# Patient Record
Sex: FEMALE
[De-identification: names, ages, dates, MRNs, and addresses within clinical notes are randomized per-mention and may not be internally consistent; named-entity substitution may affect disease eponyms.]

---

## 2021-04-16 ENCOUNTER — HOSPITAL ENCOUNTER (EMERGENCY)
Dept: HOSPITAL 52 - LL.ED | Age: 41
Discharge: HOME | End: 2021-04-16
Payer: COMMERCIAL

## 2021-04-16 DIAGNOSIS — E66.9: ICD-10-CM

## 2021-04-16 DIAGNOSIS — Z71.6: ICD-10-CM
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
## 2021-04-16 DIAGNOSIS — S60.221A: Primary | ICD-10-CM
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
## 2021-04-16 DIAGNOSIS — Z72.0: ICD-10-CM

## 2021-04-16 NOTE — EDM.PDOC
ED HPI GENERAL MEDICAL PROBLEM





- General


Chief Complaint: Lower Extremity Injury/Pain


Stated Complaint: FALL AT WORK, RIGHT LEG PAIN


Time Seen by Provider: 21 02:05


Source of Information: Reports: Patient.  Denies: Old Records (No [facility] records available)


History Limitations: Reports: No Limitations





- History of Present Illness


INITIAL COMMENTS - FREE TEXT/NARRATIVE: 





Patient was brought to the emergency room via transport vehicle from [facility] for 

evaluation of a Workmen's Compensation injury, which occurred at about 1:05 AM 

this morning.  The patient accidentally tripped over the forks of a forklift 

landing forward onto her hands and landing on her right side.  She complains of 

7/10 mid right thigh pain with some difficulty bearing weight and walking.  She 

denies any hip pain, neck/back pain, paresthesias, head injury, neurological 

deficits, or other complaints or injuries.  She has not injured this area in the

past.  The patient also has some mild abrasions on her palms bilaterally with no

history of foreign body, etc.  No recent history of abdominal pain, heartburn, 

nausea, diarrhea, melena, gross hematochezia, or any food intolerance, including

fatty foods, etc., although she has just recovered from the stomach flu.  The 

patient also denies any recent fever, cough, wheezing, dyspnea, etc..


Onset: Today, Sudden


Onset Date: 21


Onset Time: 01:05


Duration: Constant


Location: Reports: Upper Extremity, Left, Upper Extremity, Right, Lower 

Extremity, Right.  Denies: Head, Face, Neck, Chest, Abdomen, Back, Pelvis, Lower

Extremity, Left, Radiates to


Quality: Reports: Ache


Severity: Moderate


Improves with: Reports: Rest


Worsens with: Reports: Movement


Context: Reports: Other (As above).  Denies: Sick Contact, Trauma


Associated Symptoms: Denies: Confusion, Chest Pain, Cough, Diaphoresis, 

Fever/Chills, Headaches, Loss of Appetite, Malaise, Nausea/Vomiting, Seizure, 

Shortness of Breath, Syncope, Weakness


Treatments PTA: Reports: Cold Therapy


  ** Right Upper Leg


Pain Score (Numeric/FACES): 7





- Related Data


                                    Allergies











Allergy/AdvReac Type Severity Reaction Status Date / Time


 


No Known Drug Allergies Allergy  Other Verified 21 01:49











Home Meds: 


                                    Home Meds





. [No Known Home Meds]  21 [History]











Past Medical History


HEENT History: Reports: Impaired Vision, Other (See Below)


Other HEENT History: The patient was glasses


Cardiovascular History: Reports: None.  Denies: Heart Murmur, Hypertension


Respiratory History: Reports: None.  Denies: Asthma, COPD


OB/GYN History: Reports: Pregnancy, Spontaneous 


: 7


Para: 5


LMP (Approximate): Other (See Below)


Other OB/GYN History: Normal LMP 1 week ago.  SAB x2 requiring D&Cs.  Otherwise,

 full term  without complications during pregnancies or deliveries.


Musculoskeletal History: Reports: None.  Denies: Arthritis, Back Pain, Chronic, 

Fracture, Neck Pain, Chronic, Osteoarthritis


Neurological History: Reports: Vertigo


Endocrine/Metabolic History: Reports: Obesity/BMI 30+.  Denies: Diabetes, 

Gestational, Diabetes, Type I, Diabetes, Type II, Diabetes Mellitus, Type 3c, 

IDDM


Oncologic (Cancer) History: Reports: Hodgkin's Lymphoma, Other (See Below)


Other Oncologic History: Hodgkin's lymphoma in her 20s currently in remission.





- Past Surgical History


Female  Surgical History: Reports: D&C, Dilitation & Evacuation, Other (See 

Below).  Denies:  Section, Tubal Ligation


Other Female  Surgeries/Procedures: D&Cs x2 secondary to SABs.





Social & Family History





- Tobacco Use


Tobacco Use Status *Q: Current Every Day Tobacco User


Tobacco Use Within Last Twelve Months: Cigarettes


Years of Tobacco use: 29


Packs/Tins Daily: 0.5


Packs/Tins Daily Comment: Started smoking at age 12 with maximum use of 0.75 

packs/day.


Used Tobacco, but Quit: No


Smoking Cessation Information Provided To Patient: Yes


Second Hand Smoke Exposure: Yes


Source of Second Hand Smoke Exposure:  smokes


Second Hand Smoke Education Provided: Yes





- Living Situation & Occupation


Living situation: Reports: , with Family


Occupation: Employed ([employer])





Review of Systems





- Review of Systems


Review Of Systems: Comprehensive ROS is negative, except as noted in HPI.





ED EXAM, GENERAL





- Physical Exam


Exam: See Below


Exam Limited By: No Limitations


General Appearance: Alert, WD/WN, No Apparent Distress


Head: Atraumatic, Normocephalic.  No: Facial Swelling, Facial Tenderness, Sinus 

Tenderness


Neck: Normal Inspection, Supple, Non-Tender, Full Range of Motion.  No: 

Lymphadenopathy (L), Lymphadenopathy (R), Thyromegaly


Respiratory/Chest: No Respiratory Distress, Lungs Clear, Normal Breath Sounds, 

No Accessory Muscle Use, Chest Non-Tender.  No: Pleural Rub, Retractions


Cardiovascular: Normal Peripheral Pulses, Regular Rate, Rhythm, No Edema, No 

Gallop, No JVD, No Murmur, No Rub.  No: Gallop/S3, Gallop/S4, Friction Rub


Peripheral Pulses: 2+: Radial (L), Radial (R), Dorsalis Pedis (R)


GI/Abdominal: Normal Bowel Sounds, Soft, Non-Tender, No Organomegaly, No 

Distention, No Abnormal Bruit, No Mass, Pelvis Stable, Other (Obese).  No: 

Guarding


 (Female) Exam: Deferred


Rectal (Female) Exam: Deferred


Back Exam: Normal Inspection, Full Range of Motion.  No: CVA Tenderness (L), CVA

 Tenderness (R), Muscle Spasm


Extremities: No Pedal Edema, Normal Capillary Refill, Leg Pain (Mild palpation 

pain over the mid lateral right femur region with minimal localized swelling and

 no significant ecchymosis, deformity, crepitation, or evidence of fracture.  No

 evidence of injury to the right knee, hip, ankle, etc.. ), Limited Range of 

Motion (Mild right leg), Other (Mild abrasions over the palms bilaterally with 

no evidence of foreign body, deformity, crepitation, etc.).  No: Melanie's Sign


Neurological: Alert, Oriented, CN II-XII Intact, Normal Cognition, Normal Gait, 

Normal Reflexes, No Motor/Sensory Deficits


Psychiatric: Normal Affect, Normal Mood


Skin Exam: Warm, Dry, Normal Color, No Rash, Ecchymosis (As above), 

Wound/Incision (As above).  No: Diaphoretic


Lymphatic: No Adenopathy





Course





- Vital Signs


Last Recorded V/S: 


                                Last Vital Signs











Temp  36.3 C   21 01:51


 


Pulse  78   21 01:51


 


Resp  12   21 01:51


 


BP  121/79   21 01:51


 


Pulse Ox  100   21 01:51











                               Vital Signs - 24 hr











  21





  01:51


 


Temperature [ 36.3 C





Temporal] 


 


Pulse, 78





Peripheral [ 





Right Pulse 





Oximetry] 


 


Respiratory 12





Rate 


 


Blood Pressure 121/79





[Left Upper Arm 





] 


 


O2 Sat by Pulse 100





Oximetry 














- Orders/Labs/Meds


Labs: 


None


Meds: 


None





- Radiology Interpretation


Free Text/Narrative:: 





X-rays of the right femur, 2 views, shows no evidence of fracture, dislocation, 

etc.  Mild osteoarthritic changes noted.





Departure





- Departure


Time of Disposition: 03:00


Disposition: Home, Self-Care 01


Condition: Good


Clinical Impression: 


 Multiple abrasions, Tobacco abuse counseling





Contusion


Qualifiers:


 Encounter type: initial encounter Contusion area: hand Laterality: right 

Qualified Code(s): S60.221A - Contusion of right hand, initial encounter








- Discharge Information


*PRESCRIPTION DRUG MONITORING PROGRAM REVIEWED*: Not Applicable


*COPY OF PRESCRIPTION DRUG MONITORING REPORT IN PATIENT MARTINEZ: Not Applicable


Instructions:  Steps to Quit Smoking, Easy-to-Read, Crutch Use, Adult, 

Easy-to-Read, Health Risks of Smoking, Contusion, Easy-to-Read


Referrals: 


Lacie Wheeler PA-C [Primary Care Provider] - 


Forms:  ED Department Discharge


Additional Instructions: 


1.  Followup with your regular provider in 7 days as directed for reevaluation 

and change of your work restrictions.  You may follow-up with your regular 

provider sooner than this, if you feel you can perform your normal work duties. 

Bring these discharge instructions with you to that visit.


2.  BenGay or equivalent, heating pad, and/or ice packs as directed.


3.  Antibacterial soap wash/soak with subsequent antibacterial dressing such as 

Neosporin, etc. as directed 2 times per day until the wound sites completely 

heals.  Keep the area clean and dry with activity restrictions as discussed. 

Never use hydrogen peroxide for wound care.


4.  Tylenol 650 mg by mouth every 4 hours and/or OTC ibuprofen 2-3 tabs by mouth

every 6 hours with food as directed./needed. You may stagger these medications 

for 48-72 hours only, which essentially means that you are receiving a pain 

medication about every 2 hours.


5.  Immediately after this visit verify that your cellular telephone's voicemail

has been activated and is empty. Also verify that your  home telephone's 

answering machine is operating properly and has space to receive messages. Note 

that it is sometimes necessary for us to be able to contact you at a later date 

to discuss your medical care.


6.  Stop all tobacco use ASAP as directed/per provided information and consider 

contacting [facility]uit [facility], etc..


7.  Please remember that we are ALWAYS here for you and want to answer any 

questions you may have. Feel free to call the hospital any time and we call you 

back ASAP. 


8.  Work excuse- See Form





Sepsis Event Note (ED)





- Evaluation


Sepsis Screening Result: No Definite Risk





- Problem List & Annotations


(1) Contusion


SNOMED Code(s): 387628172


   Code(s): T14.8XXA - OTHER INJURY OF UNSPECIFIED BODY REGION, INITIAL 

ENCOUNTER   Status: Acute   Priority: High   Onset Date: 21   

Annotation/Comment:: Minor contusion of the right lateral femur region with no 

evidence of significant injury.  Symptomatic relief as per discharge 

instructions.  Activity restrictions were discussed.  [employer] work excuse and 

Workmen's Compensation forms were completed.  Crutches were provided.   


Qualifiers: 


   Encounter type: initial encounter   Contusion area: hand   Laterality: right 

 Qualified Code(s): S60.221A - Contusion of right hand, initial encounter   





(2) Multiple abrasions


SNOMED Code(s): 372361559, 793989507


   Code(s): T07.XXXA - UNSPECIFIED MULTIPLE INJURIES, INITIAL ENCOUNTER   

Status: Acute   Onset Date: 21   Annotation/Comment:: Minor palm abrasions

bilaterally, right greater than left.  No evidence of foreign body or need for 

repair.   





(3) Tobacco abuse counseling


SNOMED Code(s): 587191538, 648880878, 046647340


   Code(s): Z71.6 - TOBACCO ABUSE COUNSELING   Status: Chronic   Priority: 

Medium   Annotation/Comment:: Tobacco cessation strongly encouraged with 

information provided at discharge   





- Problem List Review


Problem List Initiated/Reviewed/Updated: Yes





- Assessment/Plan


Assessment:: 





As above


Plan: 





As above.  Extensive precautions were given to the patient, who is in agreement 

with the treatment plan.  See Patient Instructions for further treatment and 

plan.

## 2022-09-08 ENCOUNTER — HOSPITAL ENCOUNTER (EMERGENCY)
Dept: HOSPITAL 52 - LL.ED | Age: 42
Discharge: HOME | End: 2022-09-08
Payer: SELF-PAY
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
## 2022-09-08 DIAGNOSIS — S99.921A: Primary | ICD-10-CM

## 2022-09-19 ENCOUNTER — HOSPITAL ENCOUNTER (EMERGENCY)
Dept: HOSPITAL 52 - LL.ED | Age: 42
Discharge: HOME | End: 2022-09-19
Payer: MEDICAID

## 2022-09-19 DIAGNOSIS — Z20.822: ICD-10-CM

## 2022-09-19 DIAGNOSIS — E66.9: ICD-10-CM

## 2022-09-19 DIAGNOSIS — B34.9: Primary | ICD-10-CM

## 2022-09-19 LAB
RSV RNA UPPER RESP QL NAA+PROBE: NEGATIVE
SARS-COV-2 RNA RESP QL NAA+PROBE: NEGATIVE

## 2022-10-25 ENCOUNTER — HOSPITAL ENCOUNTER (EMERGENCY)
Dept: HOSPITAL 52 - LL.ED | Age: 42
Discharge: HOME | End: 2022-10-25
Payer: MEDICAID

## 2022-10-25 DIAGNOSIS — E66.9: ICD-10-CM

## 2022-10-25 DIAGNOSIS — Z79.899: ICD-10-CM

## 2022-10-25 DIAGNOSIS — N39.0: Primary | ICD-10-CM

## 2022-10-25 DIAGNOSIS — Z88.0: ICD-10-CM
